# Patient Record
Sex: MALE | Race: WHITE | NOT HISPANIC OR LATINO | Employment: FULL TIME | URBAN - METROPOLITAN AREA
[De-identification: names, ages, dates, MRNs, and addresses within clinical notes are randomized per-mention and may not be internally consistent; named-entity substitution may affect disease eponyms.]

---

## 2023-07-18 LAB
ALANINE AMINOTRANSFERASE (SGPT) (U/L) IN SER/PLAS: 149 U/L (ref 10–52)
ALBUMIN (G/DL) IN SER/PLAS: 4.7 G/DL (ref 3.4–5)
ALKALINE PHOSPHATASE (U/L) IN SER/PLAS: 64 U/L (ref 33–120)
ANION GAP IN SER/PLAS: 16 MMOL/L (ref 10–20)
ASPARTATE AMINOTRANSFERASE (SGOT) (U/L) IN SER/PLAS: 67 U/L (ref 9–39)
BASOPHILS (10*3/UL) IN BLOOD BY AUTOMATED COUNT: 0.04 X10E9/L (ref 0–0.1)
BASOPHILS/100 LEUKOCYTES IN BLOOD BY AUTOMATED COUNT: 0.4 % (ref 0–2)
BILIRUBIN TOTAL (MG/DL) IN SER/PLAS: 0.6 MG/DL (ref 0–1.2)
CALCIUM (MG/DL) IN SER/PLAS: 9.5 MG/DL (ref 8.6–10.6)
CARBON DIOXIDE, TOTAL (MMOL/L) IN SER/PLAS: 23 MMOL/L (ref 21–32)
CHLORIDE (MMOL/L) IN SER/PLAS: 105 MMOL/L (ref 98–107)
CHOLESTEROL (MG/DL) IN SER/PLAS: 186 MG/DL (ref 0–199)
CHOLESTEROL IN HDL (MG/DL) IN SER/PLAS: 39 MG/DL
CHOLESTEROL/HDL RATIO: 4.8
CREATININE (MG/DL) IN SER/PLAS: 0.82 MG/DL (ref 0.5–1.3)
EOSINOPHILS (10*3/UL) IN BLOOD BY AUTOMATED COUNT: 0.1 X10E9/L (ref 0–0.7)
EOSINOPHILS/100 LEUKOCYTES IN BLOOD BY AUTOMATED COUNT: 1.1 % (ref 0–6)
ERYTHROCYTE DISTRIBUTION WIDTH (RATIO) BY AUTOMATED COUNT: 12.8 % (ref 11.5–14.5)
ERYTHROCYTE MEAN CORPUSCULAR HEMOGLOBIN CONCENTRATION (G/DL) BY AUTOMATED: 33.5 G/DL (ref 32–36)
ERYTHROCYTE MEAN CORPUSCULAR VOLUME (FL) BY AUTOMATED COUNT: 90 FL (ref 80–100)
ERYTHROCYTES (10*6/UL) IN BLOOD BY AUTOMATED COUNT: 5.34 X10E12/L (ref 4.5–5.9)
ESTIMATED AVERAGE GLUCOSE FOR HBA1C: 108 MG/DL
GFR MALE: >90 ML/MIN/1.73M2
GLUCOSE (MG/DL) IN SER/PLAS: 80 MG/DL (ref 74–99)
HEMATOCRIT (%) IN BLOOD BY AUTOMATED COUNT: 47.8 % (ref 41–52)
HEMOGLOBIN (G/DL) IN BLOOD: 16 G/DL (ref 13.5–17.5)
HEMOGLOBIN A1C/HEMOGLOBIN TOTAL IN BLOOD: 5.4 %
HEPATITIS B VIRUS SURFACE AB (MIU/ML) IN SERUM: 10.9 MIU/ML
IMMATURE GRANULOCYTES/100 LEUKOCYTES IN BLOOD BY AUTOMATED COUNT: 0.6 % (ref 0–0.9)
LDL: 119 MG/DL (ref 0–99)
LEUKOCYTES (10*3/UL) IN BLOOD BY AUTOMATED COUNT: 9.5 X10E9/L (ref 4.4–11.3)
LYMPHOCYTES (10*3/UL) IN BLOOD BY AUTOMATED COUNT: 2.68 X10E9/L (ref 1.2–4.8)
LYMPHOCYTES/100 LEUKOCYTES IN BLOOD BY AUTOMATED COUNT: 28.4 % (ref 13–44)
MONOCYTES (10*3/UL) IN BLOOD BY AUTOMATED COUNT: 0.82 X10E9/L (ref 0.1–1)
MONOCYTES/100 LEUKOCYTES IN BLOOD BY AUTOMATED COUNT: 8.7 % (ref 2–10)
NEUTROPHILS (10*3/UL) IN BLOOD BY AUTOMATED COUNT: 5.75 X10E9/L (ref 1.2–7.7)
NEUTROPHILS/100 LEUKOCYTES IN BLOOD BY AUTOMATED COUNT: 60.8 % (ref 40–80)
NRBC (PER 100 WBCS) BY AUTOMATED COUNT: 0 /100 WBC (ref 0–0)
PLATELETS (10*3/UL) IN BLOOD AUTOMATED COUNT: 231 X10E9/L (ref 150–450)
POTASSIUM (MMOL/L) IN SER/PLAS: 4.1 MMOL/L (ref 3.5–5.3)
PROTEIN TOTAL: 7 G/DL (ref 6.4–8.2)
SODIUM (MMOL/L) IN SER/PLAS: 140 MMOL/L (ref 136–145)
THYROTROPIN (MIU/L) IN SER/PLAS BY DETECTION LIMIT <= 0.05 MIU/L: 2.52 MIU/L (ref 0.44–3.98)
TRIGLYCERIDE (MG/DL) IN SER/PLAS: 139 MG/DL (ref 0–149)
URATE (MG/DL) IN SER/PLAS: 8.1 MG/DL (ref 4–7.5)
UREA NITROGEN (MG/DL) IN SER/PLAS: 12 MG/DL (ref 6–23)
VLDL: 28 MG/DL (ref 0–40)

## 2024-08-19 DIAGNOSIS — M10.9 GOUT, UNSPECIFIED: ICD-10-CM

## 2024-08-20 RX ORDER — ALLOPURINOL 100 MG/1
TABLET ORAL
Qty: 180 TABLET | Refills: 3 | Status: SHIPPED | OUTPATIENT
Start: 2024-08-20

## 2024-09-17 ENCOUNTER — DOCUMENTATION (OUTPATIENT)
Dept: PRIMARY CARE | Facility: HOSPITAL | Age: 34
End: 2024-09-17
Payer: COMMERCIAL

## 2024-09-17 ENCOUNTER — OFFICE VISIT (OUTPATIENT)
Dept: PRIMARY CARE | Facility: HOSPITAL | Age: 34
End: 2024-09-17
Payer: COMMERCIAL

## 2024-09-17 VITALS
TEMPERATURE: 98.2 F | HEART RATE: 84 BPM | BODY MASS INDEX: 43.8 KG/M2 | OXYGEN SATURATION: 95 % | SYSTOLIC BLOOD PRESSURE: 124 MMHG | WEIGHT: 289 LBS | DIASTOLIC BLOOD PRESSURE: 79 MMHG | HEIGHT: 68 IN

## 2024-09-17 DIAGNOSIS — M10.9 GOUT, UNSPECIFIED: ICD-10-CM

## 2024-09-17 DIAGNOSIS — Z72.820 SLEEP DEFICIENT: ICD-10-CM

## 2024-09-17 DIAGNOSIS — Z00.00 HEALTHCARE MAINTENANCE: Primary | ICD-10-CM

## 2024-09-17 LAB
ALBUMIN SERPL BCP-MCNC: 4.7 G/DL (ref 3.4–5)
ANION GAP SERPL CALC-SCNC: 15 MMOL/L (ref 10–20)
BASOPHILS # BLD AUTO: 0.04 X10*3/UL (ref 0–0.1)
BASOPHILS NFR BLD AUTO: 0.4 %
BUN SERPL-MCNC: 15 MG/DL (ref 6–23)
CALCIUM SERPL-MCNC: 9.5 MG/DL (ref 8.6–10.6)
CHLORIDE SERPL-SCNC: 103 MMOL/L (ref 98–107)
CHOLEST SERPL-MCNC: 179 MG/DL (ref 0–199)
CHOLESTEROL/HDL RATIO: 4.6
CO2 SERPL-SCNC: 24 MMOL/L (ref 21–32)
CREAT SERPL-MCNC: 0.98 MG/DL (ref 0.5–1.3)
EGFRCR SERPLBLD CKD-EPI 2021: >90 ML/MIN/1.73M*2
EOSINOPHIL # BLD AUTO: 0.1 X10*3/UL (ref 0–0.7)
EOSINOPHIL NFR BLD AUTO: 1 %
ERYTHROCYTE [DISTWIDTH] IN BLOOD BY AUTOMATED COUNT: 13.1 % (ref 11.5–14.5)
GLUCOSE SERPL-MCNC: 119 MG/DL (ref 74–99)
HCT VFR BLD AUTO: 49.3 % (ref 41–52)
HCV AB SER QL: NONREACTIVE
HDLC SERPL-MCNC: 39.1 MG/DL
HGB BLD-MCNC: 16.1 G/DL (ref 13.5–17.5)
IMM GRANULOCYTES # BLD AUTO: 0.04 X10*3/UL (ref 0–0.7)
IMM GRANULOCYTES NFR BLD AUTO: 0.4 % (ref 0–0.9)
LDLC SERPL CALC-MCNC: 114 MG/DL
LYMPHOCYTES # BLD AUTO: 2.83 X10*3/UL (ref 1.2–4.8)
LYMPHOCYTES NFR BLD AUTO: 27.7 %
MCH RBC QN AUTO: 29.8 PG (ref 26–34)
MCHC RBC AUTO-ENTMCNC: 32.7 G/DL (ref 32–36)
MCV RBC AUTO: 91 FL (ref 80–100)
MONOCYTES # BLD AUTO: 0.85 X10*3/UL (ref 0.1–1)
MONOCYTES NFR BLD AUTO: 8.3 %
NEUTROPHILS # BLD AUTO: 6.34 X10*3/UL (ref 1.2–7.7)
NEUTROPHILS NFR BLD AUTO: 62.2 %
NON HDL CHOLESTEROL: 140 MG/DL (ref 0–149)
NRBC BLD-RTO: 0 /100 WBCS (ref 0–0)
PHOSPHATE SERPL-MCNC: 2.7 MG/DL (ref 2.5–4.9)
PLATELET # BLD AUTO: 253 X10*3/UL (ref 150–450)
POTASSIUM SERPL-SCNC: 4.5 MMOL/L (ref 3.5–5.3)
RBC # BLD AUTO: 5.4 X10*6/UL (ref 4.5–5.9)
SODIUM SERPL-SCNC: 137 MMOL/L (ref 136–145)
TRIGL SERPL-MCNC: 129 MG/DL (ref 0–149)
TSH SERPL-ACNC: 1.64 MIU/L (ref 0.44–3.98)
URATE SERPL-MCNC: 7.4 MG/DL (ref 4–7.5)
VLDL: 26 MG/DL (ref 0–40)
WBC # BLD AUTO: 10.2 X10*3/UL (ref 4.4–11.3)

## 2024-09-17 PROCEDURE — 86803 HEPATITIS C AB TEST: CPT

## 2024-09-17 PROCEDURE — 1036F TOBACCO NON-USER: CPT

## 2024-09-17 PROCEDURE — 83036 HEMOGLOBIN GLYCOSYLATED A1C: CPT

## 2024-09-17 PROCEDURE — 84443 ASSAY THYROID STIM HORMONE: CPT

## 2024-09-17 PROCEDURE — 99214 OFFICE O/P EST MOD 30 MIN: CPT

## 2024-09-17 PROCEDURE — 3008F BODY MASS INDEX DOCD: CPT

## 2024-09-17 PROCEDURE — 84550 ASSAY OF BLOOD/URIC ACID: CPT

## 2024-09-17 PROCEDURE — 36415 COLL VENOUS BLD VENIPUNCTURE: CPT

## 2024-09-17 PROCEDURE — 80061 LIPID PANEL: CPT

## 2024-09-17 PROCEDURE — 99214 OFFICE O/P EST MOD 30 MIN: CPT | Mod: GC

## 2024-09-17 PROCEDURE — 85025 COMPLETE CBC W/AUTO DIFF WBC: CPT

## 2024-09-17 PROCEDURE — 80069 RENAL FUNCTION PANEL: CPT

## 2024-09-17 RX ORDER — ALLOPURINOL 100 MG/1
200 TABLET ORAL DAILY
Qty: 90 TABLET | Refills: 7 | Status: SHIPPED | OUTPATIENT
Start: 2024-09-17

## 2024-09-17 RX ORDER — ACETAMINOPHEN 500 MG
5 TABLET ORAL DAILY
Qty: 90 TABLET | Refills: 3 | Status: SHIPPED | OUTPATIENT
Start: 2024-09-17 | End: 2025-09-17

## 2024-09-17 RX ORDER — ALLOPURINOL 100 MG/1
100 TABLET ORAL DAILY
Qty: 90 TABLET | Refills: 3 | Status: SHIPPED | OUTPATIENT
Start: 2024-09-17 | End: 2024-09-17

## 2024-09-17 RX ORDER — OMEPRAZOLE 40 MG/1
40 CAPSULE, DELAYED RELEASE ORAL
COMMUNITY

## 2024-09-17 ASSESSMENT — PATIENT HEALTH QUESTIONNAIRE - PHQ9
1. LITTLE INTEREST OR PLEASURE IN DOING THINGS: NOT AT ALL
2. FEELING DOWN, DEPRESSED OR HOPELESS: NOT AT ALL
SUM OF ALL RESPONSES TO PHQ9 QUESTIONS 1 AND 2: 0

## 2024-09-17 ASSESSMENT — ENCOUNTER SYMPTOMS
LOSS OF SENSATION IN FEET: 0
DEPRESSION: 0
OCCASIONAL FEELINGS OF UNSTEADINESS: 0

## 2024-09-17 ASSESSMENT — COLUMBIA-SUICIDE SEVERITY RATING SCALE - C-SSRS
6. HAVE YOU EVER DONE ANYTHING, STARTED TO DO ANYTHING, OR PREPARED TO DO ANYTHING TO END YOUR LIFE?: NO
1. IN THE PAST MONTH, HAVE YOU WISHED YOU WERE DEAD OR WISHED YOU COULD GO TO SLEEP AND NOT WAKE UP?: NO
2. HAVE YOU ACTUALLY HAD ANY THOUGHTS OF KILLING YOURSELF?: NO

## 2024-09-17 ASSESSMENT — PAIN SCALES - GENERAL: PAINLEVEL: 0-NO PAIN

## 2024-09-17 NOTE — PATIENT INSTRUCTIONS
Srinivas Mr. Kirkland  It was a pleasure meeting you in clinic today.  Today we talked about your ongoing sleep issues and the potential need for you to get a sleep study to figure out if you have obstructive sleep apnea.  It is important that we fix your sleep schedule/quality of sleep, to see how that affects your ongoing mood/mental health during the day.    -I have sent a referral for sleep medicine, please call the number in the back of this document to schedule a sleep study.  -I really do encourage you to start taking some melatonin around 9PM in the evening to prime you for bed at 10PM  -I have refilled your allopurinol for your gout.    I would like to see you again in 6 months to follow-up on your sleep issues, as well as how well you are doing on your weight management.    Again, it was a pleasure seeing you today.    [facility]

## 2024-09-17 NOTE — PROGRESS NOTES
I reviewed the resident/fellow's documentation and discussed the patient with the resident/fellow. I agree with the resident/fellow's medical decision making as documented in the note.     Noy Sullivan MD

## 2024-09-17 NOTE — PROGRESS NOTES
Advanced care planning discussed at this visit.  Patient does not currently have a Healthcare Power of  or Living Will in place. He does express that his relative Clifton Watkins has his permission to act as his surrogate decision maker in the event of an emergency. In addition, the patient has listed Neela Kirkland as his first alternate surrogate decision maker. Patient advised to bring in POA, Living Will and Advanced Care Plan for his chart if he  obtains one.

## 2024-09-17 NOTE — PROGRESS NOTES
Chief complaint:    HPI:  Chris Kirkland is a 34 y.o. male with PMHx of gout, GERD, and depression, presenting to the clinic for annual checkup    Patient reports not much has changed over the past year.  Continues to endorse poor sleep, citing getting about 5-1/2 to 6 hours of sleep.  Reports that oftentimes he would be feeling sleepy at 7:00 but feels like it is too early, would then stay up till 2am, as he no longer felt tired after the initial drowsiness he felt earlier in the evening.  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     Patient also reports that he thinks he might have obstructive sleep apnea, due to his current weight.  Citing that his brother also has TIMMY as well.  He expresses that he will be interested in a sleep study.  Discussed with patient about the need to lose weight in order to help reverse his TIMMY; as a CPAP machine is only a temporizing measure.  He expresses willingness to trying to lose weight.    Patient is agreeable to doing some blood work today    Review of systems largely negative.     Medications:    Current Outpatient Medications:     allopurinol (Zyloprim) 100 mg tablet, TAKE 2 TABLETS BY MOUTH DAILY. please schedule a follow up appointment, Disp: 180 tablet, Rfl: 3    tadalafil (Cialis) 5 mg tablet, TAKE 1 TABLET BY MOUTH DAILY, Disp: 30 tablet, Rfl: 11    Allergies:  Not on File    Review of systems:  Constitutional: negative for fevers, chills, weight loss, weight gain, change in appetite, fatigue, weakness.  HEENT: negative for headache, changes in vision or hearing, congestion, sore throat.  Respiratory: negative for SOB, cough, hemoptysis, wheezing  Cardiovascular: negative for chest pain, palpitations, orthopnea, PND  GI: negative for dysphagia, abdominal pain, nausea, vomiting, diarrhea, constipation, melena, hematochezia, BRBPR  : negative for frequency, urgency, dysuria, hematuria, incontinence  MSK: negative for myalgia, arthralgia,  decreased joint ROM, LE swelling  Skin: negative for rash, wounds  Heme/lymph: negative for easy bruising, bleeding, epistaxis  Neuro: negative for LOC, numbness, tingling, tremor, vertigo, dizziness    Vitals:  Vitals:    09/17/24 1038   BP: 124/79   Pulse: 84   Temp: 36.8 °C (98.2 °F)   SpO2: 95%       Physical exam:  Constitutional: Well-developed male in no acute distress.  HEENT: Normocephalic, atraumatic. PERRL. EOMI. No cervical lymphadenopathy.  Respiratory: CTA bilaterally. No wheezes, rales, or rhonchi. Normal respiratory effort.  Cardiovascular: RRR. No murmurs, gallops, or rubs. No JVD. Radial pulses 2+.  Abdominal: Soft, nondistended, nontender to palpation. Bowel sounds present. No hepatosplenomegaly or masses. No CVA tenderness.  Neuro: CN II-XII intact. UE and LE strength 5/5 bilaterally and sensation intact. Normal FTN testing.  MSK: No LE edema bilaterally.  Skin: Warm, dry. No rashes or wounds.  Psych: Appropriate mood and affect.    Labs:  No results found for this or any previous visit (from the past 24 hour(s)).    Imaging:  No results found.    Assessment and plan:  Chris Kirkland is a 34 y.o. male with PMHx of gout, GERD, and depression presenting to the clinic for  annual f/up. Today we discussed about his poor sleep schedule and the relaity of his poor sleep quality that could be a result of of undiagnsed TIMMY     #Poor Sleep schedule/Sleep Quality   -no problem of initiating sleep or going back to sleep after waking up in the middle of night  -but has had difficulty in maintaining sleep which makes him wake up 6-7 times a night  -he usually snores a lot suggesting he could have sleep apnea and also given his mod-severe obesity  -Usually goes to sleep from 2am to 7:30am    Plan:  Sleep Medicine referral for sleep study  -Melatonin 5mg   -Patient was encouraged to practicing good sleep hygiene.   -Encouraged weight loss       #Depression (not current on medication)  #Anxiety  -PHQ-9 17  (moderate-severe) but denies any suicidal ideation  ::History: was resume fluoxetine 20mg for two weeks and increase to 30mg afterwards   -encourage to find something that he thinks he can enjoy even if it can be a little thing  -xanax 0.5 mg PRN before taking flights to prevent panic episodes, sent 10 tablets  >Patient reports he was on Welbutrin (not fluoxetine)  and  but not currently taking any meds.  >For Now, we remedios plan to resolve patient sleep issues, to see if delgado resolves some of the depressive systems (like poor mental energy) that he endorses        #Gout (toes)  -no flare-up since last visit  -Continue lifestyle modifications to decrease the frequency of gout attacks, including exercise and low purine diet (avoid red meat, shellfish, alcohol)  -Continue allopurinol 200mg  -will check uric acid     #Obesity Class 3  -gained 10 lbs since last visit  -re-encourage to work on diet and small exercise like trying to use stairs and to walk inside campus as much as he can  -he wants to try these changes on his own first before referring to nutrition or weight loss program  -will get f/u lab including TSH, a1c, and lipid profile     #GERD  -continue omeprazole 40mg     #Health Maintenance:  HIV/STD screening: negative (04/2022), refuses this time since he's been sexually active with only one partner  Hepatitis B; Ab titer was undetectable in 2021 and got 2-3 times of shots at that time; will check Ab titer  COVID Vaccine: X2 , 1 booster Jan 2022  Tetanus vaccine 2020, due 2030     Plan  -lab f/u; CBC, CMP, TFT, HCsAb, lipid panel, uric acid, a1c    Follow-up in 6 month.    Patient and plan discussed with attending physician, Dr. Sullivan.    Samara Moran MD  PGY-1 Internal Medicine  [facility]

## 2024-09-18 LAB
EST. AVERAGE GLUCOSE BLD GHB EST-MCNC: 120 MG/DL
HBA1C MFR BLD: 5.8 %

## 2025-01-17 ENCOUNTER — TELEMEDICINE (OUTPATIENT)
Dept: UROLOGY | Facility: HOSPITAL | Age: 35
End: 2025-01-17
Payer: COMMERCIAL

## 2025-01-17 DIAGNOSIS — N52.9 ERECTILE DYSFUNCTION, UNSPECIFIED ERECTILE DYSFUNCTION TYPE: Primary | ICD-10-CM

## 2025-01-17 DIAGNOSIS — N52.9 MALE ERECTILE DYSFUNCTION, UNSPECIFIED: ICD-10-CM

## 2025-01-17 PROCEDURE — 99204 OFFICE O/P NEW MOD 45 MIN: CPT | Performed by: UROLOGY

## 2025-01-17 RX ORDER — TADALAFIL 5 MG/1
5 TABLET ORAL DAILY
Qty: 90 TABLET | Refills: 3 | Status: SHIPPED | OUTPATIENT
Start: 2025-01-17 | End: 2026-01-17

## 2025-01-17 NOTE — PROGRESS NOTES
FUV    Last seen - 5/11/20    Virtual or Telephone Consent    An interactive audio and video telecommunication system which permits real time communications between the patient (at the originating site) and provider (at the distant site) was utilized to provide this telehealth service.   Verbal consent was requested and obtained from Chris Kirkland on this date, 01/17/25 for a telehealth visit.       HISTORY OF PRESENT ILLNESS:   Chris Kirkland is a 35 y.o. male who is being seen today for fuv.  Has not been seen since 2020    Been on daily cialis for ED.  Working well    PAST MEDICAL HISTORY:  Past Medical History:   Diagnosis Date    Other conditions influencing health status     No significant past medical history       PAST SURGICAL HISTORY:  Past Surgical History:   Procedure Laterality Date    OTHER SURGICAL HISTORY  02/04/2019    Inguinal hernia repair        ALLERGIES:   Allergies   Allergen Reactions    Amoxicillin Hives and Rash     Hives        MEDICATIONS:   Current Outpatient Medications   Medication Instructions    allopurinol (ZYLOPRIM) 200 mg, oral, Daily    melatonin 5 mg, oral, Daily    omeprazole (PRILOSEC) 40 mg, oral, Daily before breakfast    tadalafil (CIALIS) 5 mg, oral, Daily        PHYSICAL EXAM:  There were no vitals taken for this visit.  Constitutional: Patient appears well-developed and well-nourished. No distress.    Pulmonary/Chest: Effort normal. No respiratory distress.   Musculoskeletal: Normal range of motion.    Neurological: Alert and oriented to person, place, and time.  Psychiatric: Normal mood and affect. Behavior is normal. Thought content normal.      Labs  Lab Results   Component Value Date    TESTOSTERONE 390 08/24/2020    TESTOSTERONE 244 06/10/2020     Lab Results   Component Value Date    LH 2.9 08/24/2020     Lab Results   Component Value Date    FSH 2.7 08/24/2020     Lab Results   Component Value Date    ESTRADIOL 51 08/24/2020     Lab Results   Component Value Date     PROLACTIN 6.1 08/24/2020     Lab Results   Component Value Date    GFRMALE >90 07/18/2023     Lab Results   Component Value Date    CREATININE 0.98 09/17/2024     Lab Results   Component Value Date    CHOL 179 09/17/2024     Lab Results   Component Value Date    HDL 39.1 09/17/2024     Lab Results   Component Value Date    CHHDL 4.6 09/17/2024     Lab Results   Component Value Date    LDLF 119 (H) 07/18/2023     Lab Results   Component Value Date    VLDL 26 09/17/2024     Lab Results   Component Value Date    TRIG 129 09/17/2024     Lab Results   Component Value Date    HGBA1C 5.8 (H) 09/17/2024     Lab Results   Component Value Date    HCT 49.3 09/17/2024     Assessment:      1. Erectile dysfunction, unspecified erectile dysfunction type            Chris Kirkland is a 35 y.o. male here for FUV    Has not been since since 2020    Daily tadalafil working well     Plan:   1)  Refilled tadalafil    PDE-5 inhibitor  The patient has been diagnosed with erectile dysfunction and cardiac assessment according to the [place] criteria allows use of oral PDE-5 inhibitor.  The patient understands that these medications are not initiators of erection and that he will still require sexual stimulation.  If prescribed vardenafil or sildenafil, he was advised to take the medication 30-60 minutes prior to sexual activity and that the efficacy of the medication is decreased following a high-fat meal.  The patient verbalized understanding that nitrates such as nitroglycerine, isosorbide mononitrate, isosorbide dinitrate and any other nitrate preparations are absolutely contraindicated with the use of a PDE-5 inhibitor. The patient was advised to alert any medical personal of PDE-5 inhibitor use should he seek urgent medical attention for any reason.  I explained the common adverse effects of therapy including, but not limited to headache, flushing, dizziness, rash, upset stomach, diarrhea, nasal congestion, abnormal vision, back pain,  and myalgia.  Less common side effects are lightheadedness or blood pressure drop upon standing.   We discussed that patients have  after using medications in this class, and sometimes the exact cause of death was not able to be determined.  There is a very small risk of non-arteritic ischemic optic neuropathy, a rare cause of blindness that may be permanent while using medications in this class.  Patient is instructed to immediately stop this medication and seek medical attention if he develops visual disturbances in one or both eyes.  We discussed that if he experiences erection lasting longer than four hours, he needs to seek immediate treatment at the emergency department as the longer he waits, the more damage that is done to the penile tissue.    The patient states that he is not withholding any information about his condition or the use of medications in order to receive a PDE-5 inhibitor class medication.  No barriers to learning were identified.  After all of the patient's questions were satisfactorily answered, he expressed understanding of the risks of therapy and wishes to proceed.`1

## 2025-03-18 ENCOUNTER — APPOINTMENT (OUTPATIENT)
Dept: PRIMARY CARE | Facility: HOSPITAL | Age: 35
End: 2025-03-18
Payer: COMMERCIAL